# Patient Record
Sex: MALE | Race: WHITE | NOT HISPANIC OR LATINO | Employment: FULL TIME | RURAL
[De-identification: names, ages, dates, MRNs, and addresses within clinical notes are randomized per-mention and may not be internally consistent; named-entity substitution may affect disease eponyms.]

---

## 2022-03-09 ENCOUNTER — OFFICE VISIT (OUTPATIENT)
Dept: FAMILY MEDICINE | Facility: CLINIC | Age: 57
End: 2022-03-09
Payer: COMMERCIAL

## 2022-03-09 VITALS
HEART RATE: 61 BPM | HEIGHT: 69 IN | SYSTOLIC BLOOD PRESSURE: 128 MMHG | BODY MASS INDEX: 42.06 KG/M2 | OXYGEN SATURATION: 98 % | DIASTOLIC BLOOD PRESSURE: 80 MMHG | WEIGHT: 284 LBS | RESPIRATION RATE: 18 BRPM

## 2022-03-09 DIAGNOSIS — Z12.5 SCREENING FOR PROSTATE CANCER: ICD-10-CM

## 2022-03-09 DIAGNOSIS — E78.2 MIXED HYPERLIPIDEMIA: ICD-10-CM

## 2022-03-09 DIAGNOSIS — Z23 NEED FOR INFLUENZA VACCINATION: ICD-10-CM

## 2022-03-09 DIAGNOSIS — Z00.00 ROUTINE GENERAL MEDICAL EXAMINATION AT A HEALTH CARE FACILITY: Primary | ICD-10-CM

## 2022-03-09 DIAGNOSIS — Z85.51 HISTORY OF BLADDER CANCER: ICD-10-CM

## 2022-03-09 DIAGNOSIS — E34.9 HYPOTESTOSTERONISM: ICD-10-CM

## 2022-03-09 DIAGNOSIS — I10 ESSENTIAL HYPERTENSION, BENIGN: ICD-10-CM

## 2022-03-09 LAB
BILIRUB SERPL-MCNC: ABNORMAL MG/DL
BLOOD URINE, POC: ABNORMAL
COLOR, POC UA: YELLOW
GLUCOSE UR QL STRIP: ABNORMAL
KETONES UR QL STRIP: ABNORMAL
LEUKOCYTE ESTERASE URINE, POC: ABNORMAL
NITRITE, POC UA: ABNORMAL
PH, POC UA: 6
PROTEIN, POC: ABNORMAL
SPECIFIC GRAVITY, POC UA: 1.02
UROBILINOGEN, POC UA: 0.2

## 2022-03-09 PROCEDURE — 3044F HG A1C LEVEL LT 7.0%: CPT | Mod: ,,, | Performed by: NURSE PRACTITIONER

## 2022-03-09 PROCEDURE — 3008F BODY MASS INDEX DOCD: CPT | Mod: ,,, | Performed by: NURSE PRACTITIONER

## 2022-03-09 PROCEDURE — 99396 PREV VISIT EST AGE 40-64: CPT | Mod: 25,,, | Performed by: NURSE PRACTITIONER

## 2022-03-09 PROCEDURE — 85025 COMPLETE CBC W/AUTO DIFF WBC: CPT | Mod: ,,, | Performed by: CLINICAL MEDICAL LABORATORY

## 2022-03-09 PROCEDURE — 3008F PR BODY MASS INDEX (BMI) DOCUMENTED: ICD-10-PCS | Mod: ,,, | Performed by: NURSE PRACTITIONER

## 2022-03-09 PROCEDURE — 3079F DIAST BP 80-89 MM HG: CPT | Mod: ,,, | Performed by: NURSE PRACTITIONER

## 2022-03-09 PROCEDURE — 1159F PR MEDICATION LIST DOCUMENTED IN MEDICAL RECORD: ICD-10-PCS | Mod: ,,, | Performed by: NURSE PRACTITIONER

## 2022-03-09 PROCEDURE — 80053 COMPREHENSIVE METABOLIC PANEL: ICD-10-PCS | Mod: ,,, | Performed by: CLINICAL MEDICAL LABORATORY

## 2022-03-09 PROCEDURE — 85025 CBC WITH DIFFERENTIAL: ICD-10-PCS | Mod: ,,, | Performed by: CLINICAL MEDICAL LABORATORY

## 2022-03-09 PROCEDURE — 99396 PR PREVENTIVE VISIT,EST,40-64: ICD-10-PCS | Mod: 25,,, | Performed by: NURSE PRACTITIONER

## 2022-03-09 PROCEDURE — 3074F SYST BP LT 130 MM HG: CPT | Mod: ,,, | Performed by: NURSE PRACTITIONER

## 2022-03-09 PROCEDURE — 90471 FLU VACCINE (QUAD) GREATER THAN OR EQUAL TO 3YO PRESERVATIVE FREE IM: ICD-10-PCS | Mod: ,,, | Performed by: NURSE PRACTITIONER

## 2022-03-09 PROCEDURE — G0103 PSA, SCREENING: ICD-10-PCS | Mod: ,,, | Performed by: CLINICAL MEDICAL LABORATORY

## 2022-03-09 PROCEDURE — 90686 IIV4 VACC NO PRSV 0.5 ML IM: CPT | Mod: ,,, | Performed by: NURSE PRACTITIONER

## 2022-03-09 PROCEDURE — 4010F ACE/ARB THERAPY RXD/TAKEN: CPT | Mod: ,,, | Performed by: NURSE PRACTITIONER

## 2022-03-09 PROCEDURE — 3044F PR MOST RECENT HEMOGLOBIN A1C LEVEL <7.0%: ICD-10-PCS | Mod: ,,, | Performed by: NURSE PRACTITIONER

## 2022-03-09 PROCEDURE — 90686 FLU VACCINE (QUAD) GREATER THAN OR EQUAL TO 3YO PRESERVATIVE FREE IM: ICD-10-PCS | Mod: ,,, | Performed by: NURSE PRACTITIONER

## 2022-03-09 PROCEDURE — 1159F MED LIST DOCD IN RCRD: CPT | Mod: ,,, | Performed by: NURSE PRACTITIONER

## 2022-03-09 PROCEDURE — 81003 POCT URINALYSIS W/O SCOPE: ICD-10-PCS | Mod: QW,,, | Performed by: NURSE PRACTITIONER

## 2022-03-09 PROCEDURE — 4010F PR ACE/ARB THEARPY RXD/TAKEN: ICD-10-PCS | Mod: ,,, | Performed by: NURSE PRACTITIONER

## 2022-03-09 PROCEDURE — 80053 COMPREHEN METABOLIC PANEL: CPT | Mod: ,,, | Performed by: CLINICAL MEDICAL LABORATORY

## 2022-03-09 PROCEDURE — G0103 PSA SCREENING: HCPCS | Mod: ,,, | Performed by: CLINICAL MEDICAL LABORATORY

## 2022-03-09 PROCEDURE — 3074F PR MOST RECENT SYSTOLIC BLOOD PRESSURE < 130 MM HG: ICD-10-PCS | Mod: ,,, | Performed by: NURSE PRACTITIONER

## 2022-03-09 PROCEDURE — 90471 IMMUNIZATION ADMIN: CPT | Mod: ,,, | Performed by: NURSE PRACTITIONER

## 2022-03-09 PROCEDURE — 81003 URINALYSIS AUTO W/O SCOPE: CPT | Mod: QW,,, | Performed by: NURSE PRACTITIONER

## 2022-03-09 PROCEDURE — 3079F PR MOST RECENT DIASTOLIC BLOOD PRESSURE 80-89 MM HG: ICD-10-PCS | Mod: ,,, | Performed by: NURSE PRACTITIONER

## 2022-03-09 RX ORDER — ASPIRIN 81 MG/1
81 TABLET ORAL DAILY
COMMUNITY

## 2022-03-09 RX ORDER — LISINOPRIL 10 MG/1
10 TABLET ORAL DAILY
COMMUNITY
Start: 2021-12-08 | End: 2022-03-17 | Stop reason: SDUPTHER

## 2022-03-09 RX ORDER — ATORVASTATIN CALCIUM 20 MG/1
20 TABLET, FILM COATED ORAL DAILY
Qty: 90 TABLET | Refills: 3 | Status: SHIPPED | OUTPATIENT
Start: 2022-03-09 | End: 2023-03-09

## 2022-03-09 RX ORDER — FENOFIBRATE 145 MG/1
145 TABLET, FILM COATED ORAL DAILY
COMMUNITY
Start: 2022-01-27

## 2022-03-09 RX ORDER — TESTOSTERONE CYPIONATE 200 MG/ML
200 INJECTION, SOLUTION INTRAMUSCULAR
COMMUNITY
Start: 2022-01-21

## 2022-03-09 NOTE — PROGRESS NOTES
TWIN Thorne   [facility]  [address]   [place] [address]     PATIENT NAME: Gamal Shepherd  : 1965  DATE: 3/9/22  MRN: [id number]      Billing Provider: TWIN Thorne  Level of Service:   Patient PCP Information     Provider PCP Type    TWIN Thorne General          Reason for Visit / Chief Complaint: No chief complaint on file.       Update PCP  Update Chief Complaint         History of Present Illness / Problem Focused Workflow     Gamal Shepherd is a 57 y.o. male presents to the clinic  For [facility] wellness exam.  He came by last  Week for his fasting labs. His A1c was 6.6 and he is now eating better and drinking more water. He has history of diabetes in the past and used Metformin and Ozempic with > 20# weight loss. However, he became scared of the side effects.    He has history of  hypotestosteronism  And is on replacement;   Bladder cancer and  Had surgical removal of  Area with follow up with yearly  PSA.  He has history of basal cancer of left ear and some other type  Skin cancer of right cheek.  He has MURRAY and wears CPAP and is tolerating well.    He has good energy level and is less active in the Winter but does more exercise  In the Summer.  He has HTN and his blood pressure runs 130-140 systolic with lisiniopril.    His heatlh goal for   will  Be to get glucose under control and lose weight. He will accomplish this by eating heart healthy diet, exercise daily, take meds as directed and follow  Up with appts as scheduled      Review of Systems     Review of Systems   Constitutional: Negative for fatigue.   HENT: Negative for nasal congestion and sore throat.    Respiratory: Negative for cough, chest tightness and shortness of breath.    Cardiovascular: Negative for chest pain, palpitations and leg swelling.   Gastrointestinal: Negative for nausea, vomiting and reflux.   Neurological: Negative for weakness and memory loss.   Psychiatric/Behavioral: Negative for confusion and  "sleep disturbance.        Medical / Social / Family History     Past Medical History:   Diagnosis Date    Hypertension     Sleep apnea, unspecified        History reviewed. No pertinent surgical history.    Social History    reports that he has never smoked. He has never used smokeless tobacco.    Family History  's family history includes COPD in his mother; Diabetes in his father; Heart disease in his father.    Medications and Allergies     Medications  Outpatient Medications Marked as Taking for the 3/9/22 encounter (Office Visit) with TWIN Thorne   Medication Sig Dispense Refill    aspirin (ECOTRIN) 81 MG EC tablet Take 81 mg by mouth once daily.      fenofibrate (TRICOR) 145 MG tablet Take 145 mg by mouth once daily.      lisinopriL 10 MG tablet Take 10 mg by mouth once daily.      testosterone cypionate (DEPOTESTOTERONE CYPIONATE) 200 mg/mL injection Inject 200 mg into the muscle every 14 (fourteen) days.         Allergies  Review of patient's allergies indicates:  Not on File    Physical Examination     Vitals:    03/09/22 1445 03/09/22 1447   BP: (!) 153/83 (!) 147/88   BP Location: Right arm Left arm   Patient Position: Sitting Sitting   Pulse: 61    Resp: 18    SpO2: 98%    Weight: 128.8 kg (284 lb)    Height: 5' 9" (1.753 m)       Physical Exam  Constitutional:       Appearance: He is obese.   Cardiovascular:      Rate and Rhythm: Normal rate and regular rhythm.      Pulses: Normal pulses.      Heart sounds: Normal heart sounds.   Pulmonary:      Effort: Pulmonary effort is normal.      Breath sounds: Normal breath sounds.   Feet:      Comments: Protective Sensation (w/ 10 gram monofilament):  Right: Intact  Left: Intact    Visual Inspection:  Normal -  Bilateral    Pedal Pulses:   Right: Present  Left: Present    Posterior tibialis:   Right:Present  Left: Present    Skin:     General: Skin is warm.   Neurological:      Mental Status: He is alert and oriented to person, place, and time. "          Assessment and Plan (including Health Maintenance)      Problem List  Smart Sets  Document Outside HM   :    Plan:  Will check additional labs today, including PSA and start Jardiance 10mg daily, samples #14 provided--pt will call back and if  Tolerated, will send in Rx.    He will need to follow up for early am testosterone in near future.  Discussed need to take statin due to diabets and will start low dose atorvastatin 20 mg daily  Encouraged to check glucose daily--he can purchase Reli on meter and test strips.  Flu shot today        Health Maintenance Due   Topic Date Due    Hepatitis C Screening  Never done    HIV Screening  Never done    TETANUS VACCINE  Never done    Colorectal Cancer Screening  Never done    Shingles Vaccine (1 of 2) Never done    Influenza Vaccine (1) Never done       Problem List Items Addressed This Visit    None       There are no diagnoses linked to this encounter.   Health Maintenance Topics with due status: Not Due       Topic Last Completion Date    Lipid Panel 03/02/2022       Procedures     No future appointments.     No follow-ups on file.     Signature:  TWIN Thorne    Date of encounter: 3/9/22

## 2022-03-09 NOTE — PATIENT INSTRUCTIONS
His heatlh goal for  2022 will  Be to get glucose under control and lose weight. He will accomplish this by eating heart healthy diet, exercise daily, take meds as directed and follow  Up with appts as scheduled    Will check additional labs today, including PSA and start Jardiance 10mg daily, samples #14 provided--pt will call back and if  Tolerated, will send in Rx.    He will need to follow up for early am testosterone in near future.  Discussed need to take statin due to diabets and will start low dose atorvastatin 20 mg daily    Encouraged to check glucose daily--he can purchase Reli on meter and test strips.

## 2022-03-10 PROBLEM — Z12.5 SCREENING FOR PROSTATE CANCER: Status: ACTIVE | Noted: 2022-03-10

## 2022-03-10 PROBLEM — Z23 NEED FOR INFLUENZA VACCINATION: Status: ACTIVE | Noted: 2022-03-10

## 2022-03-10 PROBLEM — I10 ESSENTIAL HYPERTENSION, BENIGN: Status: ACTIVE | Noted: 2022-03-10

## 2022-03-10 PROBLEM — E34.9 HYPOTESTOSTERONISM: Status: ACTIVE | Noted: 2022-03-10

## 2022-03-10 PROBLEM — E78.2 MIXED HYPERLIPIDEMIA: Status: ACTIVE | Noted: 2022-03-10

## 2022-03-10 PROBLEM — Z85.51 HISTORY OF BLADDER CANCER: Status: ACTIVE | Noted: 2022-03-10

## 2022-03-10 PROBLEM — Z00.00 ROUTINE GENERAL MEDICAL EXAMINATION AT A HEALTH CARE FACILITY: Status: ACTIVE | Noted: 2022-03-10

## 2022-03-10 LAB
ALBUMIN SERPL BCP-MCNC: 4.1 G/DL (ref 3.5–5)
ALBUMIN/GLOB SERPL: 1.2 {RATIO}
ALP SERPL-CCNC: 56 U/L (ref 45–115)
ALT SERPL W P-5'-P-CCNC: 45 U/L (ref 16–61)
ANION GAP SERPL CALCULATED.3IONS-SCNC: 9 MMOL/L (ref 7–16)
AST SERPL W P-5'-P-CCNC: 22 U/L (ref 15–37)
BASOPHILS # BLD AUTO: 0.04 K/UL (ref 0–0.2)
BASOPHILS NFR BLD AUTO: 0.6 % (ref 0–1)
BILIRUB SERPL-MCNC: 0.3 MG/DL (ref 0–1.2)
BUN SERPL-MCNC: 14 MG/DL (ref 7–18)
BUN/CREAT SERPL: 14 (ref 6–20)
CALCIUM SERPL-MCNC: 8.9 MG/DL (ref 8.5–10.1)
CHLORIDE SERPL-SCNC: 106 MMOL/L (ref 98–107)
CO2 SERPL-SCNC: 25 MMOL/L (ref 21–32)
CREAT SERPL-MCNC: 0.99 MG/DL (ref 0.7–1.3)
DIFFERENTIAL METHOD BLD: ABNORMAL
EOSINOPHIL # BLD AUTO: 0.07 K/UL (ref 0–0.5)
EOSINOPHIL NFR BLD AUTO: 1.1 % (ref 1–4)
ERYTHROCYTE [DISTWIDTH] IN BLOOD BY AUTOMATED COUNT: 13.4 % (ref 11.5–14.5)
GLOBULIN SER-MCNC: 3.4 G/DL (ref 2–4)
GLUCOSE SERPL-MCNC: 93 MG/DL (ref 74–106)
HCT VFR BLD AUTO: 48.2 % (ref 40–54)
HGB BLD-MCNC: 16.2 G/DL (ref 13.5–18)
IMM GRANULOCYTES # BLD AUTO: 0.04 K/UL (ref 0–0.04)
IMM GRANULOCYTES NFR BLD: 0.6 % (ref 0–0.4)
LYMPHOCYTES # BLD AUTO: 2.08 K/UL (ref 1–4.8)
LYMPHOCYTES NFR BLD AUTO: 31.8 % (ref 27–41)
MCH RBC QN AUTO: 29.2 PG (ref 27–31)
MCHC RBC AUTO-ENTMCNC: 33.6 G/DL (ref 32–36)
MCV RBC AUTO: 87 FL (ref 80–96)
MONOCYTES # BLD AUTO: 0.72 K/UL (ref 0–0.8)
MONOCYTES NFR BLD AUTO: 11 % (ref 2–6)
MPC BLD CALC-MCNC: 10.4 FL (ref 9.4–12.4)
NEUTROPHILS # BLD AUTO: 3.6 K/UL (ref 1.8–7.7)
NEUTROPHILS NFR BLD AUTO: 54.9 % (ref 53–65)
NRBC # BLD AUTO: 0 X10E3/UL
NRBC, AUTO (.00): 0 %
PLATELET # BLD AUTO: 277 K/UL (ref 150–400)
POTASSIUM SERPL-SCNC: 4 MMOL/L (ref 3.5–5.1)
PROT SERPL-MCNC: 7.5 G/DL (ref 6.4–8.2)
PSA SERPL-MCNC: 1.07 NG/ML (ref 0–3.1)
RBC # BLD AUTO: 5.54 M/UL (ref 4.6–6.2)
SODIUM SERPL-SCNC: 136 MMOL/L (ref 136–145)
WBC # BLD AUTO: 6.55 K/UL (ref 4.5–11)

## 2022-03-11 NOTE — PROGRESS NOTES
Gamal,  Your chemistry test is normal,  your PSA test is normal. Blood count is normal with no anemia.  Please call and talk with my nurse, Micaela, if you have any questions.    Mariola MURCIA

## 2022-03-17 DIAGNOSIS — I10 ESSENTIAL HYPERTENSION, BENIGN: Primary | ICD-10-CM

## 2022-03-17 DIAGNOSIS — E11.9 TYPE 2 DIABETES MELLITUS WITHOUT COMPLICATION, UNSPECIFIED WHETHER LONG TERM INSULIN USE: ICD-10-CM

## 2022-03-17 RX ORDER — EMPAGLIFLOZIN 10 MG/1
10 TABLET, FILM COATED ORAL DAILY
Qty: 30 TABLET | Refills: 3 | Status: SHIPPED | OUTPATIENT
Start: 2022-03-17

## 2022-03-17 RX ORDER — LISINOPRIL 10 MG/1
10 TABLET ORAL DAILY
Qty: 30 TABLET | Refills: 3 | Status: SHIPPED | OUTPATIENT
Start: 2022-03-17

## 2022-03-17 RX ORDER — EMPAGLIFLOZIN 10 MG/1
10 TABLET, FILM COATED ORAL DAILY
COMMUNITY
End: 2022-03-17 | Stop reason: SDUPTHER

## 2022-03-17 NOTE — TELEPHONE ENCOUNTER
----- Message from Mena Mcmillan sent at 3/16/2022  4:38 PM CDT -----  Regarding: REFILL  PATIENT NEEDS A REFILL ON LISINOPRIL 10 MG SENT TO [facility] IN [place]. [phone]

## 2022-06-13 PROBLEM — Z00.00 ROUTINE GENERAL MEDICAL EXAMINATION AT A HEALTH CARE FACILITY: Status: RESOLVED | Noted: 2022-03-10 | Resolved: 2022-06-13

## 2022-11-30 DIAGNOSIS — E11.9 TYPE 2 DIABETES MELLITUS WITHOUT COMPLICATION, UNSPECIFIED WHETHER LONG TERM INSULIN USE: ICD-10-CM

## 2024-04-02 DIAGNOSIS — Z12.11 COLON CANCER SCREENING: Primary | ICD-10-CM

## 2024-04-02 DIAGNOSIS — Z12.11 SCREEN FOR COLON CANCER: Primary | ICD-10-CM

## 2024-04-02 RX ORDER — POLYETHYLENE GLYCOL 3350, SODIUM SULFATE ANHYDROUS, SODIUM BICARBONATE, SODIUM CHLORIDE, POTASSIUM CHLORIDE 236; 22.74; 6.74; 5.86; 2.97 G/4L; G/4L; G/4L; G/4L; G/4L
4 POWDER, FOR SOLUTION ORAL ONCE
Qty: 4000 ML | Refills: 0 | Status: SHIPPED | OUTPATIENT
Start: 2024-04-02 | End: 2024-04-02

## 2024-06-03 DIAGNOSIS — Z12.11 COLON CANCER SCREENING: Primary | ICD-10-CM

## 2024-12-23 ENCOUNTER — PATIENT OUTREACH (OUTPATIENT)
Facility: HOSPITAL | Age: 59
End: 2024-12-23
Payer: COMMERCIAL

## 2024-12-23 ENCOUNTER — OFFICE VISIT (OUTPATIENT)
Dept: FAMILY MEDICINE | Facility: CLINIC | Age: 59
End: 2024-12-23
Payer: COMMERCIAL

## 2024-12-23 VITALS
SYSTOLIC BLOOD PRESSURE: 140 MMHG | WEIGHT: 262 LBS | TEMPERATURE: 98 F | BODY MASS INDEX: 38.8 KG/M2 | RESPIRATION RATE: 18 BRPM | HEIGHT: 69 IN | HEART RATE: 65 BPM | DIASTOLIC BLOOD PRESSURE: 84 MMHG

## 2024-12-23 DIAGNOSIS — E11.9 CONTROLLED TYPE 2 DIABETES MELLITUS WITHOUT COMPLICATION, WITHOUT LONG-TERM CURRENT USE OF INSULIN: Primary | Chronic | ICD-10-CM

## 2024-12-23 DIAGNOSIS — E34.9 HYPOTESTOSTERONISM: Chronic | ICD-10-CM

## 2024-12-23 DIAGNOSIS — I10 HYPERTENSION, UNSPECIFIED TYPE: Chronic | ICD-10-CM

## 2024-12-23 DIAGNOSIS — Z23 NEED FOR INFLUENZA VACCINATION: ICD-10-CM

## 2024-12-23 DIAGNOSIS — R09.81 NASAL CONGESTION: ICD-10-CM

## 2024-12-23 LAB
ANION GAP SERPL CALCULATED.3IONS-SCNC: 13 MMOL/L (ref 7–16)
BUN SERPL-MCNC: 16 MG/DL (ref 8–26)
BUN/CREAT SERPL: 19 (ref 6–20)
CALCIUM SERPL-MCNC: 8.7 MG/DL (ref 8.8–10)
CHLORIDE SERPL-SCNC: 106 MMOL/L (ref 98–107)
CHOLEST SERPL-MCNC: 206 MG/DL
CHOLEST/HDLC SERPL: 4 {RATIO}
CO2 SERPL-SCNC: 24 MMOL/L (ref 23–31)
CREAT SERPL-MCNC: 0.84 MG/DL (ref 0.72–1.25)
EGFR (NO RACE VARIABLE) (RUSH/TITUS): 100 ML/MIN/1.73M2
EST. AVERAGE GLUCOSE BLD GHB EST-MCNC: 126 MG/DL
GLUCOSE SERPL-MCNC: 96 MG/DL (ref 82–115)
HBA1C MFR BLD HPLC: 6 %
HDLC SERPL-MCNC: 51 MG/DL (ref 35–60)
LDLC SERPL CALC-MCNC: 127 MG/DL
LDLC/HDLC SERPL: 2.5 {RATIO}
NONHDLC SERPL-MCNC: 155 MG/DL
POTASSIUM SERPL-SCNC: 4.5 MMOL/L (ref 3.5–5.1)
SODIUM SERPL-SCNC: 138 MMOL/L (ref 136–145)
TRIGL SERPL-MCNC: 139 MG/DL (ref 34–140)
VLDLC SERPL-MCNC: 28 MG/DL

## 2024-12-23 PROCEDURE — G0008 ADMIN INFLUENZA VIRUS VAC: HCPCS | Mod: ,,, | Performed by: NURSE PRACTITIONER

## 2024-12-23 PROCEDURE — 99214 OFFICE O/P EST MOD 30 MIN: CPT | Mod: 25,,, | Performed by: NURSE PRACTITIONER

## 2024-12-23 PROCEDURE — 1160F RVW MEDS BY RX/DR IN RCRD: CPT | Mod: CPTII,,, | Performed by: NURSE PRACTITIONER

## 2024-12-23 PROCEDURE — 3008F BODY MASS INDEX DOCD: CPT | Mod: CPTII,,, | Performed by: NURSE PRACTITIONER

## 2024-12-23 PROCEDURE — 3079F DIAST BP 80-89 MM HG: CPT | Mod: CPTII,,, | Performed by: NURSE PRACTITIONER

## 2024-12-23 PROCEDURE — 1159F MED LIST DOCD IN RCRD: CPT | Mod: CPTII,,, | Performed by: NURSE PRACTITIONER

## 2024-12-23 PROCEDURE — 90656 IIV3 VACC NO PRSV 0.5 ML IM: CPT | Mod: ,,, | Performed by: NURSE PRACTITIONER

## 2024-12-23 PROCEDURE — 3077F SYST BP >= 140 MM HG: CPT | Mod: CPTII,,, | Performed by: NURSE PRACTITIONER

## 2024-12-23 RX ORDER — AMLODIPINE BESYLATE 10 MG/1
10 TABLET ORAL
COMMUNITY
Start: 2024-07-25 | End: 2024-12-24

## 2024-12-23 RX ORDER — TRIAMCINOLONE ACETONIDE 55 UG/1
1 SPRAY, METERED NASAL DAILY
Qty: 10.8 ML | Refills: 3 | Status: SHIPPED | OUTPATIENT
Start: 2024-12-23

## 2024-12-23 NOTE — PROGRESS NOTES
TWIN Thorne   [facility]  [address]   [place] [address]     PATIENT NAME: Gamal Shepherd  : 1965  DATE: 24  MRN: [id number]      Billing Provider: TWIN Thorne  Level of Service: IN OFFICE/OUTPT VISIT, ZENON ULRICH III, 30-44 MIN  Patient PCP Information       Provider PCP Type    TWIN Thorne General            Reason for Visit / Chief Complaint: lab work (Requests testosterone level )         History of Present Illness / Problem Focused Workflow     Gamal Shepherd is a 59 y.o. male presents to the clinic  for check up and refills. He has a history of type 2 diabetes and is currently managing with diet alone.  Blood pressure is elevated and is on amlodipine 10mg  daily and his blood pressure is in the 147/60 range.   He is now having swelling in his ankles.    He has MURRAY and is wearing his cpap regularly without  oxygen--he has not had sleep studies in 20 years. He is on pressure 12.  Discussed with patient that he has  to see sleep doctor soon.   He feels fatigued and has ED and has been on testosterone  for several years.  He has not had labs done in a while.        Review of Systems     Review of Systems   Constitutional:  Positive for fatigue.   HENT:  Positive for rhinorrhea. Negative for nasal congestion and sore throat.    Respiratory:  Negative for cough, chest tightness and shortness of breath.    Cardiovascular:  Negative for chest pain, palpitations and leg swelling.   Gastrointestinal:  Negative for nausea, vomiting and reflux.   Neurological:  Negative for weakness and memory loss.   Psychiatric/Behavioral:  Negative for confusion and sleep disturbance.         Medical / Social / Family History     Past Medical History:   Diagnosis Date    Hypertension     Sleep apnea, unspecified        History reviewed. No pertinent surgical history.    Social History    reports that he has never smoked. He has never used smokeless tobacco. He reports that he does not currently use  "alcohol. He reports that he does not use drugs.    Family History  's family history includes COPD in his mother; Diabetes in his father; Heart disease in his father.    Medications and Allergies     Medications  Outpatient Medications Marked as Taking for the 12/23/24 encounter (Office Visit) with Mariola Cuevas FNP   Medication Sig Dispense Refill    amLODIPine (NORVASC) 10 MG tablet Take 10 mg by mouth.      aspirin (ECOTRIN) 81 MG EC tablet Take 81 mg by mouth once daily.      testosterone cypionate (DEPOTESTOTERONE CYPIONATE) 200 mg/mL injection Inject 200 mg into the muscle every 14 (fourteen) days.       Current Facility-Administered Medications for the 12/23/24 encounter (Office Visit) with Mariola Cuevas FNP   Medication Dose Route Frequency Provider Last Rate Last Admin    [COMPLETED] influenza (Flulaval, Fluzone, Fluarix) 45 mcg/0.5 mL IM vaccine (> or = 6 mo) 0.5 mL  0.5 mL Intramuscular 1 time in Clinic/HOD Mariola Cuevas FNP   0.5 mL at 12/23/24 0856       Allergies  Review of patient's allergies indicates:  No Known Allergies    Physical Examination     Vitals:    12/23/24 0826 12/23/24 0839 12/23/24 0905   BP: (!) 184/97 (!) 168/97 (!) 140/84   BP Location:   Right arm   Patient Position:   Sitting   Pulse: 65     Resp: 18     Temp: 97.5 °F (36.4 °C)     TempSrc: Oral     Weight: 118.8 kg (262 lb)     Height: 5' 9" (1.753 m)        Physical Exam  Constitutional:       Appearance: He is obese.   HENT:      Head:      Comments: Bilateral hearing aids noted     Nose: Congestion present.      Mouth/Throat:      Pharynx: Posterior oropharyngeal erythema present.   Cardiovascular:      Rate and Rhythm: Normal rate and regular rhythm.      Pulses: Normal pulses.      Heart sounds: Normal heart sounds.   Pulmonary:      Effort: Pulmonary effort is normal.      Breath sounds: Normal breath sounds.   Musculoskeletal:      Right lower leg: Edema present.      Left lower leg: Edema (1+ edema bilateral) present. "   Lymphadenopathy:      Cervical: No cervical adenopathy.   Neurological:      Mental Status: He is alert and oriented to person, place, and time.          Assessment and Plan (including Health Maintenance)     :    Plan:  pt will keep bp log daily x 2 weeks and follow up for bp check.  Consider decreasing amlodipine 5mg to to decrease edema and add  HTN meds to decrease edema and control bp.  Encouraged to  check glucoses daily and monitor diet.    Will call pt with test results and  refill testosterone  after labs back.  Recommneded he start checking glucoses daily as well and record.   Recommended he use nasocort once daily before bedtime to help with nasal congestion and to avoid using  other nasal sprays on a regular basis due to rebound effect.   Pt has appt 1/24/25 for colonoscopy according to epic schedule.   Will get medical records from Dr Frank's office        Health Maintenance Due   Topic Date Due    Hepatitis C Screening  Never done    HIV Screening  Never done    Colorectal Cancer Screening  Never done    Pneumococcal Vaccines (Age 50+) (1 of 1 - PCV) Never done    RSV Vaccine (Age 60+ and Pregnant patients) (1 - Risk 60-74 years 1-dose series) Never done    COVID-19 Vaccine (1 - 2024-25 season) Never done       Problem List Items Addressed This Visit       Need for influenza vaccination    Hypotestosteronism     Other Visit Diagnoses       Controlled type 2 diabetes mellitus without complication, without long-term current use of insulin  (Chronic)   -  Primary    Hypertension, unspecified type  (Chronic)       Nasal congestion            .  Controlled type 2 diabetes mellitus without complication, without long-term current use of insulin  -     Hemoglobin A1C; Future; Expected date: 12/23/2024  -     Lipid Panel; Future; Expected date: 12/23/2024    Hypotestosteronism  -     Testosterone, Total and Free, S; Future; Expected date: 12/23/2024  -     triamcinolone (NASACORT) 55 mcg nasal inhaler; 1  spray by Nasal route once daily.  Dispense: 10.8 mL; Refill: 3    Need for influenza vaccination  -     influenza (Flulaval, Fluzone, Fluarix) 45 mcg/0.5 mL IM vaccine (> or = 6 mo) 0.5 mL    Hypertension, unspecified type  -     Basic Metabolic Panel; Future; Expected date: 12/23/2024    Nasal congestion  -     triamcinolone (NASACORT) 55 mcg nasal inhaler; 1 spray by Nasal route once daily.  Dispense: 10.8 mL; Refill: 3       Health Maintenance Topics with due status: Not Due       Topic Last Completion Date    TETANUS VACCINE 08/21/2015    Hemoglobin A1c (Diabetic Prevention Screening) 03/02/2022    Lipid Panel 03/02/2022       Procedures     Future Appointments   Date Time Provider Department Center   1/24/2025  9:30 AM [facility] ROOM 02 [facility]   3/24/2025  9:30 AM Mariola Cuevas FNP [facility]        No follow-ups on file.     Signature:  TWIN Thorne    Date of encounter: 12/23/24

## 2024-12-23 NOTE — PROGRESS NOTES
12/23/2024   --Chart accessed for: Care Gaps  Immunization Database (Immprint/MIXX) reviewed. Vaccinations uploaded: Tdap and zoster recombinant    Health Maintenance Due   Topic Date Due    Hepatitis C Screening  Never done    HIV Screening  Never done    Colorectal Cancer Screening  Never done    Pneumococcal Vaccines (Age 50+) (1 of 1 - PCV) Never done    RSV Vaccine (Age 60+ and Pregnant patients) (1 - Risk 60-74 years 1-dose series) Never done    COVID-19 Vaccine (1 - 2024-25 season) Never done

## 2024-12-24 DIAGNOSIS — I10 ESSENTIAL HYPERTENSION, BENIGN: Primary | ICD-10-CM

## 2024-12-24 RX ORDER — ROSUVASTATIN CALCIUM 10 MG/1
10 TABLET, COATED ORAL DAILY
Qty: 90 TABLET | Refills: 3 | Status: SHIPPED | OUTPATIENT
Start: 2024-12-24 | End: 2025-12-24

## 2024-12-24 RX ORDER — VALSARTAN 40 MG/1
40 TABLET ORAL DAILY
Qty: 90 TABLET | Refills: 3 | Status: SHIPPED | OUTPATIENT
Start: 2024-12-24 | End: 2025-12-24

## 2024-12-24 RX ORDER — AMLODIPINE BESYLATE 5 MG/1
5 TABLET ORAL DAILY
Qty: 90 TABLET | Refills: 3 | Status: SHIPPED | OUTPATIENT
Start: 2024-12-24 | End: 2025-12-24

## 2024-12-24 NOTE — PROGRESS NOTES
Notify Gmaal that his   cholesterol is fair, his bad cholesterol should  under 100--needs to be on a statin--I am sending in low dose rosuvastatin 10mg daily.  A1c is good at 6.0 and electrolytes are all normal with excellent kidney function/tm

## 2024-12-24 NOTE — PROGRESS NOTES
I am sending in amlodipine 5mg and valsartan 40mg to the pharmacy and he will take both meds.  Follow up in one month for BP check/tm

## 2024-12-24 NOTE — PROGRESS NOTES
Notified pt of lab results and instructions. Pt agreed to start the statin. He also asked if we were still going to decrease his amlodipine to 5mg and add another med to it d/t his swelling. States Mariola mentioned it while he was here.

## 2024-12-30 DIAGNOSIS — I10 ESSENTIAL HYPERTENSION, BENIGN: ICD-10-CM

## 2024-12-30 RX ORDER — AMLODIPINE BESYLATE 5 MG/1
5 TABLET ORAL DAILY
Qty: 90 TABLET | Refills: 3 | Status: SHIPPED | OUTPATIENT
Start: 2024-12-30 | End: 2025-12-30

## 2024-12-30 NOTE — TELEPHONE ENCOUNTER
Pt was checking on his testosterone level and wants the refills all sent to [facility] in [place].

## 2024-12-30 NOTE — TELEPHONE ENCOUNTER
----- Message from Rafaela sent at 12/30/2024 12:04 PM CST -----  NEEDS A CALL BACK REGARDING HIS RESULTS [phone]

## 2025-01-02 ENCOUNTER — PATIENT MESSAGE (OUTPATIENT)
Dept: FAMILY MEDICINE | Facility: CLINIC | Age: 60
End: 2025-01-02
Payer: COMMERCIAL

## 2025-01-02 DIAGNOSIS — E34.9 HYPOTESTOSTERONISM: Primary | ICD-10-CM

## 2025-01-24 ENCOUNTER — ANESTHESIA EVENT (OUTPATIENT)
Dept: GASTROENTEROLOGY | Facility: HOSPITAL | Age: 60
End: 2025-01-24
Payer: COMMERCIAL

## 2025-01-24 ENCOUNTER — HOSPITAL ENCOUNTER (OUTPATIENT)
Dept: GASTROENTEROLOGY | Facility: HOSPITAL | Age: 60
Discharge: HOME | End: 2025-01-24
Attending: INTERNAL MEDICINE | Admitting: INTERNAL MEDICINE
Payer: COMMERCIAL

## 2025-01-24 ENCOUNTER — ANESTHESIA (OUTPATIENT)
Dept: GASTROENTEROLOGY | Facility: HOSPITAL | Age: 60
End: 2025-01-24
Payer: COMMERCIAL

## 2025-01-24 VITALS
HEART RATE: 59 BPM | BODY MASS INDEX: 38.8 KG/M2 | OXYGEN SATURATION: 98 % | HEIGHT: 69 IN | TEMPERATURE: 98 F | DIASTOLIC BLOOD PRESSURE: 96 MMHG | SYSTOLIC BLOOD PRESSURE: 164 MMHG | WEIGHT: 262 LBS | RESPIRATION RATE: 11 BRPM

## 2025-01-24 DIAGNOSIS — Z12.11 COLON CANCER SCREENING: ICD-10-CM

## 2025-01-24 DIAGNOSIS — K57.30 DIVERTICULOSIS OF COLON: Primary | ICD-10-CM

## 2025-01-24 PROCEDURE — D9220A PRA ANESTHESIA: Mod: ,,, | Performed by: NURSE ANESTHETIST, CERTIFIED REGISTERED

## 2025-01-24 PROCEDURE — G0121 COLON CA SCRN NOT HI RSK IND: HCPCS | Mod: ,,, | Performed by: INTERNAL MEDICINE

## 2025-01-24 PROCEDURE — 37000008 HC ANESTHESIA 1ST 15 MINUTES

## 2025-01-24 PROCEDURE — 37000009 HC ANESTHESIA EA ADD 15 MINS

## 2025-01-24 PROCEDURE — G0121 COLON CA SCRN NOT HI RSK IND: HCPCS | Performed by: INTERNAL MEDICINE

## 2025-01-24 RX ORDER — SODIUM CHLORIDE 0.9 % (FLUSH) 0.9 %
10 SYRINGE (ML) INJECTION EVERY 6 HOURS PRN
Status: DISCONTINUED | OUTPATIENT
Start: 2025-01-24 | End: 2025-01-25 | Stop reason: HOSPADM

## 2025-01-24 RX ORDER — SODIUM CHLORIDE, SODIUM LACTATE, POTASSIUM CHLORIDE, CALCIUM CHLORIDE 600; 310; 30; 20 MG/100ML; MG/100ML; MG/100ML; MG/100ML
INJECTION, SOLUTION INTRAVENOUS CONTINUOUS
Status: DISCONTINUED | OUTPATIENT
Start: 2025-01-24 | End: 2025-01-25 | Stop reason: HOSPADM

## 2025-01-24 NOTE — ANESTHESIA POSTPROCEDURE EVALUATION
Anesthesia Post Evaluation    Patient: Gamal Shepherd    Procedure(s) Performed: * No procedures listed *    Final Anesthesia Type: MAC      Patient location during evaluation: GI PACU ([facility]x [facility])  Patient participation: Yes- Able to Participate  Level of consciousness: awake and alert  Post-procedure vital signs: reviewed and stable  Pain management: adequate  Airway patency: patent    PONV status at discharge: No PONV  Anesthetic complications: no      Cardiovascular status: blood pressure returned to baseline, hemodynamically stable and stable  Respiratory status: unassisted  Hydration status: euvolemic  Follow-up not needed.  Comments: Refer to nursing note for pain/bk score upon discharge from recovery.               Vitals Value Taken Time   /96 01/24/25 1055   Temp 36.6 °C (97.8 °F) 01/24/25 1045   Pulse 59 01/24/25 1057   Resp 9 01/24/25 1057   SpO2 97 % 01/24/25 1057   Vitals shown include unfiled device data.      Event Time   Out of Recovery 11:04:00         Pain/Bk Score: Bk Score: 10 (1/24/2025 10:39 AM)

## 2025-01-24 NOTE — TRANSFER OF CARE
"Anesthesia Transfer of Care Note    Patient: Gamal Shepherd    Procedure(s) Performed: * No procedures listed *    Patient location: GI    Anesthesia Type: general (pt was not Arousable even with painful stimulation during the procedure)    Transport from OR: Transported from OR on room air with adequate spontaneous ventilation    Post pain: adequate analgesia    Post assessment: no apparent anesthetic complications    Post vital signs: stable    Level of consciousness: sedated and responds to stimulation    Nausea/Vomiting: no nausea/vomiting    Complications: none    Transfer of care protocol was followedComments: Good SV continue, NAD noted, VSS, RTRN       Last vitals: Visit Vitals  /80   Pulse (!) 58   Temp 36.6 °C (97.8 °F) (Oral)   Resp 18   Ht 5' 9" (1.753 m)   Wt 118.8 kg (262 lb)   SpO2 (!) 94%   BMI 38.69 kg/m²     "

## 2025-01-24 NOTE — DISCHARGE INSTRUCTIONS
Procedure Date  1/24/25     Impression  Overall Impression:   Diverticulosis in the ascending colon, descending colon and sigmoid colon  Digital rectal exam revealed no mass.  No colon polyps were seen.  Pandiverticulosis was present.  Impression: Screening for colon neoplasm, colon diverticulosis  Recommendation  Repeat screening colonoscopy in 10 years      Disposition:  Discharge patient to home in stable condition.  High-fiber diet.  No driving until tomorrow.  Follow up with PCP as scheduled, return to GI clinic p.r.n..    No driving today, no operating heavy machinery, no signing any legal documents until tomorrow.    Drink lots of fluids, resume regular diet.  Take your normal medications.     Please call our office for any nausea, vomiting or abdominal pain.

## 2025-01-24 NOTE — ANESTHESIA PREPROCEDURE EVALUATION
01/24/2025  Gamal Shepherd is a 60 y.o., male.      Pre-op Assessment    I have reviewed the Patient Summary Reports.     I have reviewed the Nursing Notes. I have reviewed the NPO Status.   I have reviewed the Medications.     Review of Systems  Anesthesia Hx:  No problems with previous Anesthesia   History of prior surgery of interest to airway management or planning:          Denies Family Hx of Anesthesia complications.    Denies Personal Hx of Anesthesia complications.                    Cardiovascular:     Hypertension                                    Hypertension         Pulmonary:        Sleep Apnea     Obstructive Sleep Apnea (MURRAY).             Active Ambulatory Problems     Diagnosis Date Noted    History of bladder cancer 03/10/2022    Essential hypertension, benign 03/10/2022    Mixed hyperlipidemia 03/10/2022    Need for influenza vaccination 03/10/2022    Hypotestosteronism 03/10/2022     Resolved Ambulatory Problems     Diagnosis Date Noted    Routine general medical examination at a health care facility 03/10/2022     Past Medical History:   Diagnosis Date    Hypertension     Sleep apnea, unspecified       Current Outpatient Medications on File Prior to Visit   Medication Sig Dispense Refill    amLODIPine (NORVASC) 5 MG tablet Take 1 tablet (5 mg total) by mouth once daily. 90 tablet 3    aspirin (ECOTRIN) 81 MG EC tablet Take 81 mg by mouth once daily.      rosuvastatin (CRESTOR) 10 MG tablet Take 1 tablet (10 mg total) by mouth once daily. 90 tablet 3    testosterone cypionate (DEPOTESTOTERONE CYPIONATE) 200 mg/mL injection Inject 200 mg into the muscle every 14 (fourteen) days.      triamcinolone (NASACORT) 55 mcg nasal inhaler 1 spray by Nasal route once daily. 10.8 mL 3    valsartan (DIOVAN) 40 MG tablet Take 1 tablet (40 mg total) by mouth once daily. 90 tablet 3     No current  facility-administered medications on file prior to visit.    No past surgical history on file.     Physical Exam  General: Well nourished    Airway:  Mallampati: II   Mouth Opening: Normal  TM Distance: Normal, at least 6 cm  Tongue: Normal  Neck ROM: Normal ROM        Anesthesia Plan  Type of Anesthesia, risks & benefits discussed:    Anesthesia Type: MAC  Intra-op Monitoring Plan: Standard ASA Monitors  Post Op Pain Control Plan: multimodal analgesia  Induction:  IV  Informed Consent: Informed consent signed with the Patient and all parties understand the risks and agree with anesthesia plan.  All questions answered. Patient consented to blood products? No  ASA Score: 3  Day of Surgery Review of History & Physical: H&P Update referred to the surgeon/provider.I have interviewed and examined the patient. I have reviewed the patient's H&P dated: There are no significant changes.     Ready For Surgery From Anesthesia Perspective.     .

## 2025-01-24 NOTE — H&P
[facility]  Gastroenterology  H&P    Patient Name: Gamal Shepherd  MRN: [id number]  Admission Date: 1/24/2025  Code Status: No Order    Attending Provider: Manish Hernandez MD   Primary Care Physician: Mariola Cuevas FNP  Principal Problem:<principal problem not specified>    Subjective:     History of Present Illness:  This patient is a 60-year-old male who presents for screening colonoscopy.  No prior colonoscopy report is available in the chart.    Past Medical History:   Diagnosis Date    Basal cell carcinoma of ear     left    Bladder cancer     Hypercholesteremia     Hypertension     Sleep apnea, unspecified        Past Surgical History:   Procedure Laterality Date    CYST REMOVAL      right knee    EXCISION OF EXTERNAL EAR Left     r/t carcinoma    TUMOR REMOVAL      bladder, r/t cancer       Review of patient's allergies indicates:  No Known Allergies  Family History       Problem Relation (Age of Onset)    COPD Mother    Diabetes Father    Heart disease Father          Tobacco Use    Smoking status: Never    Smokeless tobacco: Never   Substance and Sexual Activity    Alcohol use: Not Currently    Drug use: Never    Sexual activity: Yes     Partners: Female     Review of Systems   Respiratory: Negative.     Cardiovascular: Negative.    Gastrointestinal: Negative.      Objective:     Vital Signs (Most Recent):  Temp: 97.3 °F (36.3 °C) (01/24/25 0933)  Pulse: (!) 58 (01/24/25 0933)  Resp: 12 (01/24/25 0933)  BP: (!) 172/85 (01/24/25 0933)  SpO2: 96 % (01/24/25 0933) Vital Signs (24h Range):  Temp:  [97.3 °F (36.3 °C)] 97.3 °F (36.3 °C)  Pulse:  [58] 58  Resp:  [12] 12  SpO2:  [96 %] 96 %  BP: (172)/(85) 172/85     Weight: 118.8 kg (262 lb) (01/24/25 0933)  Body mass index is 38.69 kg/m².    No intake or output data in the 24 hours ending 01/24/25 1003    Lines/Drains/Airways       Peripheral Intravenous Line  Duration                  Peripheral IV - Single Lumen 01/24/25 0954 22 G Posterior;Right Hand <1  "day                    Physical Exam  Vitals reviewed.   Constitutional:       General: He is not in acute distress.     Appearance: Normal appearance. He is well-developed. He is obese. He is not ill-appearing.   HENT:      Head: Normocephalic and atraumatic.      Nose: Nose normal.   Eyes:      Pupils: Pupils are equal, round, and reactive to light.   Cardiovascular:      Rate and Rhythm: Normal rate and regular rhythm.   Pulmonary:      Effort: Pulmonary effort is normal.      Breath sounds: Normal breath sounds. No wheezing.   Abdominal:      General: Abdomen is flat. Bowel sounds are normal. There is no distension.      Palpations: Abdomen is soft.      Tenderness: There is no abdominal tenderness. There is no guarding.   Skin:     General: Skin is warm and dry.      Coloration: Skin is not jaundiced.   Neurological:      Mental Status: He is alert.   Psychiatric:         Attention and Perception: Attention normal.         Mood and Affect: Affect normal.         Speech: Speech normal.         Behavior: Behavior is cooperative.      Comments: Pt was calm while speaking.         Significant Labs:  CBC: No results for input(s): "WBC", "HGB", "HCT", "PLT" in the last 48 hours.  CMP: No results for input(s): "GLU", "CALCIUM", "ALBUMIN", "PROT", "NA", "K", "CO2", "CL", "BUN", "CREATININE", "ALKPHOS", "ALT", "AST", "BILITOT" in the last 48 hours.    Significant Imaging:  Imaging results within the past 24 hours have been reviewed.    Assessment/Plan:     There are no hospital problems to display for this patient.        Impression: Screening for colon neoplasm  Plan: Colonoscopy today    Myron Ibarra MD  Gastroenterology  [facility]  "